# Patient Record
Sex: FEMALE | Race: WHITE | URBAN - METROPOLITAN AREA
[De-identification: names, ages, dates, MRNs, and addresses within clinical notes are randomized per-mention and may not be internally consistent; named-entity substitution may affect disease eponyms.]

---

## 2020-05-15 ENCOUNTER — HISTORICAL (OUTPATIENT)
Dept: ADMINISTRATIVE | Facility: HOSPITAL | Age: 70
End: 2020-05-15

## 2020-05-15 LAB
CHOLEST SERPL-MCNC: 178 MG/DL
CHOLEST/HDLC SERPL: 3 {RATIO} (ref 0–5)
HDLC SERPL-MCNC: 58 MG/DL (ref 35–60)
LDLC SERPL CALC-MCNC: 101 MG/DL (ref 50–140)
TRIGL SERPL-MCNC: 97 MG/DL (ref 37–140)
VLDLC SERPL CALC-MCNC: 19 MG/DL

## 2024-06-29 ENCOUNTER — OFFICE VISIT (OUTPATIENT)
Dept: URGENT CARE | Facility: CLINIC | Age: 74
End: 2024-06-29
Payer: MEDICARE

## 2024-06-29 VITALS
HEART RATE: 71 BPM | RESPIRATION RATE: 18 BRPM | TEMPERATURE: 98 F | BODY MASS INDEX: 28.52 KG/M2 | WEIGHT: 155 LBS | OXYGEN SATURATION: 96 % | DIASTOLIC BLOOD PRESSURE: 89 MMHG | HEIGHT: 62 IN | SYSTOLIC BLOOD PRESSURE: 138 MMHG

## 2024-06-29 DIAGNOSIS — R39.15 URINARY URGENCY: Primary | ICD-10-CM

## 2024-06-29 DIAGNOSIS — N30.01 ACUTE CYSTITIS WITH HEMATURIA: ICD-10-CM

## 2024-06-29 LAB
BILIRUB SERPL-MCNC: 4 MG/DL
BLOOD URINE, POC: 50
CLARITY, POC UA: ABNORMAL
COLOR, POC UA: ABNORMAL
GLUCOSE UR QL STRIP: NEGATIVE
KETONES UR QL STRIP: NEGATIVE
LEUKOCYTE ESTERASE URINE, POC: 75
NITRITE, POC UA: POSITIVE
PH, POC UA: 6.5
PROTEIN, POC: 30
SPECIFIC GRAVITY, POC UA: 1.01
UROBILINOGEN, POC UA: 12

## 2024-06-29 PROCEDURE — 81002 URINALYSIS NONAUTO W/O SCOPE: CPT | Mod: ,,,

## 2024-06-29 PROCEDURE — 87086 URINE CULTURE/COLONY COUNT: CPT

## 2024-06-29 PROCEDURE — 99203 OFFICE O/P NEW LOW 30 MIN: CPT | Mod: ,,,

## 2024-06-29 RX ORDER — PANTOPRAZOLE SODIUM 40 MG/1
40 TABLET, DELAYED RELEASE ORAL
COMMUNITY

## 2024-06-29 RX ORDER — SULFAMETHOXAZOLE AND TRIMETHOPRIM 800; 160 MG/1; MG/1
1 TABLET ORAL 2 TIMES DAILY
Qty: 14 TABLET | Refills: 0 | Status: SHIPPED | OUTPATIENT
Start: 2024-06-29 | End: 2024-07-06

## 2024-06-29 RX ORDER — MULTIVITAMIN
TABLET ORAL
COMMUNITY

## 2024-06-29 RX ORDER — PROPRANOLOL HYDROCHLORIDE 80 MG/1
1 CAPSULE, EXTENDED RELEASE ORAL
COMMUNITY

## 2024-06-29 RX ORDER — ASPIRIN 325 MG
50000 TABLET, DELAYED RELEASE (ENTERIC COATED) ORAL
COMMUNITY
Start: 2024-04-07

## 2024-06-29 RX ORDER — LATANOPROST 50 UG/ML
SOLUTION/ DROPS OPHTHALMIC
COMMUNITY

## 2024-06-29 RX ORDER — HYDRALAZINE HYDROCHLORIDE 25 MG/1
25 TABLET, FILM COATED ORAL 2 TIMES DAILY
COMMUNITY

## 2024-06-29 RX ORDER — TIMOLOL MALEATE 5 MG/ML
1 SOLUTION/ DROPS OPHTHALMIC 2 TIMES DAILY
COMMUNITY
Start: 2024-06-14

## 2024-06-29 RX ORDER — HYDROCHLOROTHIAZIDE 25 MG/1
25 TABLET ORAL
COMMUNITY
Start: 2024-04-03

## 2024-06-29 NOTE — PROGRESS NOTES
"Subjective:      Patient ID: Ivette Nation is a 73 y.o. female.    Vitals:  height is 5' 2" (1.575 m) and weight is 70.3 kg (155 lb). Her oral temperature is 97.9 °F (36.6 °C). Her blood pressure is 138/89 and her pulse is 71. Her respiration is 18 and oxygen saturation is 96%.     Chief Complaint: Dysuria (Bladder spasms, urinary urgency, retention, since this morning, has taken AZO)    A 74 y/o female presents to the clinic with c/o bladder spasms, dysuria, urinary urgency and frequency, and decreased urine output since this am. She denies any fever, cristiano hematuria, flank pain, body aches, chills, sob, cp, n/v/d, or abdominal complaints.  She reports having multiple UTIs this year, her last in April in which she took bactrim with improvement and without any other issues. Discussed a possible urology referral if the infections continue.       Dysuria   Associated symptoms include frequency and urgency. Pertinent negatives include no chills, flank pain, hematuria, nausea or vomiting.       Constitution: Negative for chills and fever.   HENT: Negative.     Gastrointestinal:  Negative for abdominal pain, nausea, vomiting and diarrhea.   Genitourinary:  Positive for dysuria, frequency, urgency and urine decreased. Negative for flank pain, hematuria and pelvic pain.      Objective:     Physical Exam   Constitutional: She is oriented to person, place, and time. She appears well-developed. She is cooperative.  Non-toxic appearance. She does not appear ill. No distress.   HENT:   Head: Normocephalic and atraumatic.   Ears:   Right Ear: Hearing and external ear normal.   Left Ear: Hearing and external ear normal.   Mouth/Throat: Oropharynx is clear and moist and mucous membranes are normal. Mucous membranes are moist. Oropharynx is clear.   Eyes: Conjunctivae and lids are normal.   Neck: Trachea normal and phonation normal. Neck supple. No edema present. No erythema present. No neck rigidity present.   Cardiovascular: " Normal rate.   Pulmonary/Chest: Effort normal and breath sounds normal. No stridor. No respiratory distress. She has no decreased breath sounds. She has no wheezes. She has no rhonchi.   Abdominal: Normal appearance. There is no left CVA tenderness and no right CVA tenderness.   Neurological: She is alert and oriented to person, place, and time. She exhibits normal muscle tone.   Skin: Skin is warm, dry, intact, not diaphoretic and no rash.   Psychiatric: Her speech is normal and behavior is normal. Mood normal.   Nursing note and vitals reviewed.       Previous History      Review of patient's allergies indicates:   Allergen Reactions    Tetanus and diphther. tox (pf) Rash       Past Medical History:   Diagnosis Date    GERD (gastroesophageal reflux disease)     Hypertension      Current Outpatient Medications   Medication Instructions    cholecalciferol (vitamin D3) 50,000 Units, Oral, Every 7 days    hydrALAZINE (APRESOLINE) 25 mg, Oral, 2 times daily    hydroCHLOROthiazide (HYDRODIURIL) 25 mg, Oral    latanoprost 0.005 % ophthalmic solution 1 drop into affected eye in the evening Ophthalmic Once a day for 75    multivit with min-folic acid 0.4 mg Tab 1 tablet Orally Once a day    pantoprazole (PROTONIX) 40 mg, Oral    propranoloL (INDERAL LA) 80 MG 24 hr capsule 1 capsule    sulfamethoxazole-trimethoprim 800-160mg (BACTRIM DS) 800-160 mg Tab 1 tablet, Oral, 2 times daily    timolol maleate 0.5% (TIMOPTIC) 0.5 % Drop 1 drop, Both Eyes, 2 times daily     Past Surgical History:   Procedure Laterality Date    APPENDECTOMY      BLADDER SUSPENSION       SECTION      x 2    tib/fib fx repair Left     2020     Family History   Problem Relation Name Age of Onset    Lung cancer Mother      Hypertension Father      Bladder Cancer Father      Lung cancer Sister      Bladder Cancer Sister         Social History     Tobacco Use    Smoking status: Former     Types: Cigarettes    Smokeless tobacco: Never   Substance Use  "Topics    Alcohol use: Yes     Alcohol/week: 2.0 standard drinks of alcohol     Types: 2 Glasses of wine per week    Drug use: Never        Physical Exam      Vital Signs Reviewed   /89   Pulse 71   Temp 97.9 °F (36.6 °C) (Oral)   Resp 18   Ht 5' 2" (1.575 m)   Wt 70.3 kg (155 lb)   LMP  (LMP Unknown)   SpO2 96%   BMI 28.35 kg/m²        Procedures    Procedures     Labs     Results for orders placed or performed in visit on 06/29/24   POCT URINE DIPSTICK WITHOUT MICROSCOPE   Result Value Ref Range    Glucose, UA negative     Bilirubin, POC 4     Ketones, UA negative     Spec Grav UA 1.010     Blood, UA 50     pH, UA 6.5     Protein, POC 30     Urobilinogen, UA 12     Nitrite, UA positive     WBC, UA 75     Color, UA Orange     Clarity, UA Slightly Cloudy          Assessment:     1. Urinary urgency    2. Acute cystitis with hematuria        Plan:       Urinary urgency  -     POCT URINE DIPSTICK WITHOUT MICROSCOPE  -     Cancel: POCT Urinalysis, Dipstick, Automated, W/O Scope    Acute cystitis with hematuria  -     Urine Culture High Risk    Other orders  -     sulfamethoxazole-trimethoprim 800-160mg (BACTRIM DS) 800-160 mg Tab; Take 1 tablet by mouth 2 (two) times daily. for 7 days  Dispense: 14 tablet; Refill: 0    Medication sent to pharmacy.  We will culture urine and call you with the results when they become available.  AZO as needed for bladder spasms. Monitor for fever. Always wipe from front to back. Avoid scented soaps or bubble baths. Increase your Hydration. Avoid sugary drinks, or caffeine as they can irritate your bladder.    If your symptoms persist or worsen or you develop fever, back pain, vomiting, or belly pain  seek medical attention immediately                   "

## 2024-06-29 NOTE — PATIENT INSTRUCTIONS
Medication sent to pharmacy. Take the antibiotic with food, consider starting an over the counter probiotic or eating yogurt with active cultures while on this medication. We will culture urine and call you with the results when they become available.  AZO as needed for bladder spasms. Monitor for fever. Always wipe from front to back. Avoid scented soaps or bubble baths. Increase your Hydration. Avoid sugary drinks, or caffeine as they can irritate your bladder.    If your symptoms persist or worsen or you develop fever, back pain, vomiting, or belly pain  seek medical attention immediately     If you continue to have symptoms or develop another infection in the next 1-2 months, I would advise a urology follow up, contact this clinic or your PCP for a referral if needed.

## 2024-07-01 LAB — BACTERIA UR CULT: NORMAL
